# Patient Record
Sex: FEMALE | Race: BLACK OR AFRICAN AMERICAN | URBAN - METROPOLITAN AREA
[De-identification: names, ages, dates, MRNs, and addresses within clinical notes are randomized per-mention and may not be internally consistent; named-entity substitution may affect disease eponyms.]

---

## 2017-01-13 ENCOUNTER — OFFICE VISIT (OUTPATIENT)
Dept: FAMILY MEDICINE | Facility: CLINIC | Age: 77
End: 2017-01-13

## 2017-01-13 VITALS
HEIGHT: 61 IN | DIASTOLIC BLOOD PRESSURE: 80 MMHG | HEART RATE: 80 BPM | TEMPERATURE: 97.9 F | WEIGHT: 150 LBS | OXYGEN SATURATION: 96 % | BODY MASS INDEX: 28.32 KG/M2 | SYSTOLIC BLOOD PRESSURE: 180 MMHG | RESPIRATION RATE: 18 BRPM

## 2017-01-13 DIAGNOSIS — I10 ESSENTIAL HYPERTENSION WITH GOAL BLOOD PRESSURE LESS THAN 140/90: ICD-10-CM

## 2017-01-13 LAB
ANION GAP SERPL CALCULATED.3IONS-SCNC: 6 MMOL/L (ref 3–14)
BUN SERPL-MCNC: 10 MG/DL (ref 7–30)
CALCIUM SERPL-MCNC: 9.1 MG/DL (ref 8.5–10.1)
CHLORIDE SERPL-SCNC: 104 MMOL/L (ref 94–109)
CO2 SERPL-SCNC: 29 MMOL/L (ref 20–32)
CREAT SERPL-MCNC: 0.68 MG/DL (ref 0.52–1.04)
CREAT UR-MCNC: 31 MG/DL
GFR SERPL CREATININE-BSD FRML MDRD: 84 ML/MIN/1.7M2
GLUCOSE SERPL-MCNC: 87 MG/DL (ref 70–99)
MICROALBUMIN UR-MCNC: <5 MG/L
MICROALBUMIN/CREAT UR: NORMAL MG/G CR (ref 0–25)
POTASSIUM SERPL-SCNC: 4.2 MMOL/L (ref 3.4–5.3)
SODIUM SERPL-SCNC: 139 MMOL/L (ref 133–144)

## 2017-01-13 PROCEDURE — 99214 OFFICE O/P EST MOD 30 MIN: CPT | Performed by: PHYSICIAN ASSISTANT

## 2017-01-13 PROCEDURE — 36415 COLL VENOUS BLD VENIPUNCTURE: CPT | Performed by: PHYSICIAN ASSISTANT

## 2017-01-13 PROCEDURE — 80048 BASIC METABOLIC PNL TOTAL CA: CPT | Performed by: PHYSICIAN ASSISTANT

## 2017-01-13 PROCEDURE — 82043 UR ALBUMIN QUANTITATIVE: CPT | Performed by: PHYSICIAN ASSISTANT

## 2017-01-13 RX ORDER — AMLODIPINE BESYLATE 10 MG/1
TABLET ORAL
Qty: 30 TABLET | Refills: 0 | Status: SHIPPED | OUTPATIENT
Start: 2017-01-13 | End: 2017-02-14

## 2017-01-13 ASSESSMENT — PAIN SCALES - GENERAL: PAINLEVEL: EXTREME PAIN (9)

## 2017-01-13 NOTE — MR AVS SNAPSHOT
After Visit Summary   1/13/2017    Talita Sharp    MRN: [id number]           Patient Information     Date Of Birth          1940        Visit Information        Provider Department      1/13/2017 10:40 AM Karma Mehta PA-C [facility]        Today's Diagnoses     Hypertension goal BP (blood pressure) < 140/90    -  1     Essential hypertension with goal blood pressure less than 140/90           Care Instructions    How to contact your care team: [phone] Pharmacy [phone]   TOMÁS AU MD KATYA GEORGIEV, PA-C CHRIS JONES, PA-C NAM HO, MD JONATHAN BATES, MD ARVIN VOCAL, MD    Clinic hours M-Th 7am-7pm Fri 7am-5pm.   Urgent care M-F 11am-9pm  Sat/Sun 9am-5pm.   Pharmacy   Mon-Th:  8:00am-8pm   Fri:  8:00am-6:00pm  Sat/Sun  8:00am-5:00 pm             Follow-ups after your visit        Your next 10 appointments already scheduled     Feb 15, 2017 10:20 AM   [facility] Visit with Karma Mehta PA-C   [facility]    [address] [place] [id number] [phone]           Bring a current list of meds and any records pertaining to this visit.  For Physicals, please bring immunization records and any forms needing to be filled out.  Please arrive 10 minutes early to complete paperwork.              Who to contact     If you have questions or need follow up information about today's clinic visit or your schedule please contact [facility] directly at [phone].  Normal or non-critical lab and imaging results will be communicated to you by MyChart, letter or phone within 4 business days after the clinic has received the results. If you do not hear from us within 7 days, please contact the clinic through MyChart or phone. If you have a critical or abnormal lab result, we will notify you by phone as soon as possible.  Submit refill requests  "through Coin or call your pharmacy and they will forward the refill request to us. Please allow 3 business days for your refill to be completed.          Additional Information About Your Visit        [employer] Information     Coin lets you send messages to your doctor, view your test results, renew your prescriptions, schedule appointments and more. To sign up, go to [email or web address] . Click on \"Log in\" on the left side of the screen, which will take you to the Welcome page. Then click on \"Sign up Now\" on the right side of the page.     You will be asked to enter the access code listed below, as well as some personal information. Please follow the directions to create your username and password.     Your access code is: [email or web address]  Expires: 2017 11:03 AM     Your access code will  in 90 days. If you need help or a new code, please call your [place] clinic or [phone].        Care EveryWhere ID     This is your Care EveryWhere ID. This could be used by other organizations to access your [place] medical records  [id number]        Your Vitals Were     Pulse Temperature Respirations Height BMI (Body Mass Index) Pulse Oximetry    80 97.9  F (36.6  C) (Oral) 18 5' 0.5\" (1.537 m) 28.80 kg/m2 96%    Breastfeeding?                   No            Blood Pressure from Last 3 Encounters:   17 180/80   16 176/80   16 170/94    Weight from Last 3 Encounters:   17 150 lb (68.04 kg)   16 150 lb 12.8 oz (68.402 kg)   16 158 lb (71.668 kg)              We Performed the Following     Albumin Random Urine Quantitative     BASIC METABOLIC PANEL          Today's Medication Changes          These changes are accurate as of: 17 11:03 AM.  If you have any questions, ask your nurse or doctor.               These medicines have changed or have updated prescriptions.        Dose/Directions    amLODIPine 10 MG tablet   Commonly known as:  NORVASC   This may have changed:  " See the new instructions.   Used for:  Essential hypertension with goal blood pressure less than 140/90   Changed by:  Karma Mehta PA-C        TAKE 1 TABLET(10 MG) BY MOUTH DAILY   Quantity:  30 tablet   Refills:  0            Where to get your medicines      These medications were sent to [id number] - [place], [place] - [address] [facility]  [address] [facility] [address]    Hours:  24-hours Phone:  [phone]    - amLODIPine 10 MG tablet             Primary Care Provider Office Phone #    [facility] [phone]       No address on file        Thank you!     Thank you for choosing [facility]  for your care. Our goal is always to provide you with excellent care. Hearing back from our patients is one way we can continue to improve our services. Please take a few minutes to complete the written survey that you may receive in the mail after your visit with us. Thank you!             Your Updated Medication List - Protect others around you: Learn how to safely use, store and throw away your medicines at www.disposemymeds.org.          This list is accurate as of: 1/13/17 11:03 AM.  Always use your most recent med list.                   Brand Name Dispense Instructions for use    acetaminophen 325 MG tablet    TYLENOL    100 tablet    Take 2 tablets (650 mg) by mouth every 4 hours as needed for mild pain       amLODIPine 10 MG tablet    NORVASC    30 tablet    TAKE 1 TABLET(10 MG) BY MOUTH DAILY

## 2017-01-13 NOTE — PATIENT INSTRUCTIONS
How to contact your care team: [phone] Pharmacy [phone]   TOMÁS AU MD KATYA GEORGIEV, PA-C CHRIS JONES, PA-C NAM HO, MD JONATHAN BATES, MD ARVIN VOCAL, MD    Clinic hours M-Th 7am-7pm Fri 7am-5pm.   Urgent care M-F 11am-9pm  Sat/Sun 9am-5pm.   Pharmacy   Mon-Th:  8:00am-8pm   Fri:  8:00am-6:00pm  Sat/Sun  8:00am-5:00 pm

## 2017-01-13 NOTE — Clinical Note
[facility]  [address] [place] [address]  [phone]      January 16, 2017      Talita Sharp  [address] [address]              Dear Talita,      Your recent labs were normal.  Please follow instructions as discussed at our last office visit. Keep your scheduled follow up for recheck of your blood pressure.  Make sure you are taking your medication every day.  If you have any questions, please feel free to contact our office.      Sincerely,      Karma Mehta PA-C    Results for orders placed or performed in visit on 01/13/17   BASIC METABOLIC PANEL   Result Value Ref Range    Sodium 139 133 - 144 mmol/L    Potassium 4.2 3.4 - 5.3 mmol/L    Chloride 104 94 - 109 mmol/L    Carbon Dioxide 29 20 - 32 mmol/L    Anion Gap 6 3 - 14 mmol/L    Glucose 87 70 - 99 mg/dL    Urea Nitrogen 10 7 - 30 mg/dL    Creatinine 0.68 0.52 - 1.04 mg/dL    GFR Estimate 84 >60 mL/min/1.7m2    GFR Estimate If Black >90   GFR Calc   >60 mL/min/1.7m2    Calcium 9.1 8.5 - 10.1 mg/dL   Albumin Random Urine Quantitative   Result Value Ref Range    Creatinine Urine 31 mg/dL    Albumin Urine mg/L <5 mg/L    Albumin Urine mg/g Cr Unable to calculate due to low value 0 - 25 mg/g Cr

## 2017-01-13 NOTE — PROGRESS NOTES
"  SUBJECTIVE:                                                    Talita Sharp is a 76 year old female who presents to clinic today for hypertension follow up.  Not at goal at home or in clinic.  She is non compliant with medications, taking the pills only on days that she takes her BP and sees that it is elevated.  Denies chest pain, sob, orthopnea, exertional symptoms.      Hypertension Follow-up      Outpatient blood pressures are being checked at home.  Results are \"up and down\".    Low Salt Diet: no added salt       Amount of exercise or physical activity: None    Problems taking medications regularly: not currently taking medication    Medication side effects: none    Diet: regular (no restrictions)    Problem list and histories reviewed & adjusted, as indicated.  Additional history: as documented    Patient Active Problem List   Diagnosis     CARDIOVASCULAR SCREENING; LDL GOAL LESS THAN 160     Hypertension goal BP (blood pressure) < 140/90     Bilateral low back pain without sciatica     Past Surgical History   Procedure Laterality Date     No history of surgery         Social History   Substance Use Topics     Smoking status: Never Smoker      Smokeless tobacco: Never Used     Alcohol Use: No     History reviewed. No pertinent family history.      Current Outpatient Prescriptions   Medication Sig Dispense Refill     amLODIPine (NORVASC) 10 MG tablet TAKE 1 TABLET(10 MG) BY MOUTH DAILY 30 tablet 0     [DISCONTINUED] amLODIPine (NORVASC) 10 MG tablet TAKE 1 TABLET(10 MG) BY MOUTH DAILY 30 tablet 0     acetaminophen (TYLENOL) 325 MG tablet Take 2 tablets (650 mg) by mouth every 4 hours as needed for mild pain 100 tablet 0     No Known Allergies  BP Readings from Last 3 Encounters:   01/13/17 180/80   09/16/16 176/80   04/26/16 170/94    Wt Readings from Last 3 Encounters:   01/13/17 150 lb (68.04 kg)   09/16/16 150 lb 12.8 oz (68.402 kg)   04/26/16 158 lb (71.668 kg)          ROS:  Constitutional, neuro, ENT, " "endocrine, pulmonary, cardiac, gastrointestinal, genitourinary, musculoskeletal, integument and psychiatric systems are negative, except as otherwise noted.  CV: NEGATIVE for chest pain, palpitations or peripheral edema and POSITIVE for HX HTN    OBJECTIVE:                                                    /80 mmHg  Pulse 80  Temp(Src) 97.9  F (36.6  C) (Oral)  Resp 18  Ht 5' 0.5\" (1.537 m)  Wt 150 lb (68.04 kg)  BMI 28.80 kg/m2  SpO2 96%  Breastfeeding? No  Body mass index is 28.8 kg/(m^2).     GENERAL APPEARANCE: healthy, alert and no distress  NECK: no adenopathy, no asymmetry, masses, or scars, thyroid normal to palpation and no bruits  RESP: lungs clear to auscultation - no rales, rhonchi or wheezes  CV: regular rates and rhythm, normal S1 S2, no S3 or S4 and no murmur, click or rub  MS: extremities normal- no gross deformities noted  SKIN: no suspicious lesions or rashes  NEURO: Normal strength and tone, mentation intact and speech normal  PSYCH: mentation appears normal and affect normal/bright       ASSESSMENT/PLAN:                                                    (I10) Essential hypertension with goal blood pressure less than 140/90  Comment: not at goal, non-compliant  Plan: BASIC METABOLIC PANEL, Albumin Random Urine         Quantitative, amLODIPine (NORVASC) 10 MG tablet        I have again reviewed the importance of taking her BP pills everyday and that this medication is not doing anything for her being taken at the current frequency (30 pills lasted 90+ days).  Her daughter accompanies her today and states understanding.  It is unclear if patient truly understands the importance but she does nod when explained.  Her daughter will try to help her remember.  May be beneficial to take her BP everyday since this seems to be a trigger for her taking her medication.  However, it becomes controlled she may stop the medication, seeing normal readings.  I have reviewed her BP goal of less than " 150/90 for age and problem list/medical history, as well as the risks for untreated hypertension.       Follow up with Provider - 4 weeks - scheduled     Karma Mehta PA-C  [facility]

## 2017-01-13 NOTE — NURSING NOTE
"Chief Complaint   Patient presents with     Hypertension       Initial /80 mmHg  Pulse 80  Temp(Src) 97.9  F (36.6  C) (Oral)  Resp 18  Ht 5' 0.5\" (1.537 m)  Wt 150 lb (68.04 kg)  BMI 28.80 kg/m2  SpO2 96%  Breastfeeding? No Estimated body mass index is 28.8 kg/(m^2) as calculated from the following:    Height as of this encounter: 5' 0.5\" (1.537 m).    Weight as of this encounter: 150 lb (68.04 kg).  BP completed using cuff size: large    Angeles Luna MA         "

## 2017-01-16 NOTE — PROGRESS NOTES
Quick Note:    Please mail results to patient with the following message:    Dear Talita,    Your recent labs were normal. Please follow instructions as discussed at our last office visit. Keep your scheduled follow up for recheck of your blood pressure. Make sure you are taking your medication every day. If you have any questions, please feel free to contact our office.    Thanks,    Karma Mehta PA-C    Your care team's suggested websites for health information:  Www.[employer].org : Up to date and easily searchable information on multiple topics.  Www.medlineplus.gov : medication info, interactive tutorials, watch real surgeries online    Www.familydoctor.org : good info from the Academy of Family Physicians  Www.cdc.gov : public health info, travel advisories, epidemics (H1N1)  Www.aap.org : children's health info, normal development, vaccinations  Www.health.[facility].[place].us : [place] dept of   kelli, public health issues in [place], N1N1    How to contact your care team: [facility] [phone] Pharmacy [phone]  Dr. Sudheer Broussard, Dr. Richie Avery, Dr. Debbi López, Dr. Mya Patel, Karma Mehta PA-C.  Clinic hours  M-Th 7am-7pm  Fri 7am-6pm.   Urgent care M-F 12am-8pm,  Sat/sun 9am-5pm.  Pharmacy M-F 8:30am-8pm Sat/sun 9am-5pm.     ______

## 2017-02-14 ENCOUNTER — OFFICE VISIT (OUTPATIENT)
Dept: FAMILY MEDICINE | Facility: CLINIC | Age: 77
End: 2017-02-14

## 2017-02-14 VITALS
OXYGEN SATURATION: 99 % | BODY MASS INDEX: 24.16 KG/M2 | TEMPERATURE: 98.5 F | HEIGHT: 65 IN | WEIGHT: 145 LBS | SYSTOLIC BLOOD PRESSURE: 145 MMHG | DIASTOLIC BLOOD PRESSURE: 85 MMHG

## 2017-02-14 DIAGNOSIS — I10 HTN, GOAL BELOW 150/90: Primary | ICD-10-CM

## 2017-02-14 PROCEDURE — 99213 OFFICE O/P EST LOW 20 MIN: CPT | Performed by: PREVENTIVE MEDICINE

## 2017-02-14 RX ORDER — AMLODIPINE BESYLATE 10 MG/1
TABLET ORAL
Qty: 90 TABLET | Refills: 1 | Status: SHIPPED | OUTPATIENT
Start: 2017-02-14 | End: 2017-09-04

## 2017-02-14 ASSESSMENT — PAIN SCALES - GENERAL: PAINLEVEL: NO PAIN (0)

## 2017-02-14 NOTE — PATIENT INSTRUCTIONS
At [facility], we strive to deliver an exceptional experience to you, every time we see you.    If you receive a survey in the mail, please send us back your thoughts. We really do value your feedback.    Thank you for visiting [facility]    Normal or non-critical lab and imaging results will be communicated to you by MyChart, letter or phone within 7 days.  If you do not hear from us within 10 days, please call the clinic. If you have a critical or abnormal lab result, we will notify you by phone as soon as possible.     If you have any questions regarding your visit please contact:     [facility]  Clinic Hours Telephone Number   Dr. Shar Fine   7am-7pm  Monday through Thursday  7am-5pm Friday [phone]  Christina BAILON RN   Pharmacy 8:30am-9pm Monday-Friday    9am-5pm Saturday-Sunday [phone]   Urgent Care 11am-9pm Monday-Friday        9am-5pm Saturday-Sunday [phone]     After hours, weekend or if you need to make an appointment with your primary provider please call [phone].   After Hours nurse advise: call [place] [facility]: [phone]    Use [employer] (secure email communication and access to your chart) to send your primary care provider a message or make an appointment. Ask someone on your Team how to sign up for [employer]. To log on to [employer] or for more information in [employer] please visit the website at [email or web address].   As of October 8, 2013, all password changes, disabled accounts, or ID changes in [employer]/MyHealth will be done by our Access Services Department.   If you need help with your account or password, call: [phone]. Clinic staff no longer has the ability to change passwords.

## 2017-02-14 NOTE — NURSING NOTE
"Chief Complaint   Patient presents with     Hypertension       Initial /73 (BP Location: Right arm, Cuff Size: Adult Regular)  Temp 98.5  F (36.9  C) (Oral)  Ht 5' 5\" (1.651 m)  Wt 145 lb (65.8 kg)  SpO2 99%  Breastfeeding? No  BMI 24.13 kg/m2 Estimated body mass index is 24.13 kg/(m^2) as calculated from the following:    Height as of this encounter: 5' 5\" (1.651 m).    Weight as of this encounter: 145 lb (65.8 kg).  Medication Reconciliation: complete   Alma VIDALES        "

## 2017-02-14 NOTE — PROGRESS NOTES
SUBJECTIVE:                                                    Talita Sharp is a 76 year old female who presents to clinic today for the following health issues:    I have reviewed and agree with the documentation by the MA. I updated the history as indicated.  Mya Patel MD MPH      Hypertension Follow-up      Outpatient blood pressures are being checked at home.  Results are unknown.    Low Salt Diet: low salt       Amount of exercise or physical activity: None    Problems taking medications regularly: No    Medication side effects: none  Diet: low salt  Ran out of medication yesterday     Problem list and histories reviewed & adjusted, as indicated.  Additional history: as documented    Patient Active Problem List   Diagnosis     CARDIOVASCULAR SCREENING; LDL GOAL LESS THAN 160     Hypertension goal BP (blood pressure) < 140/90     Bilateral low back pain without sciatica     HTN, goal below 150/90     Past Surgical History   Procedure Laterality Date     No history of surgery         Social History   Substance Use Topics     Smoking status: Never Smoker     Smokeless tobacco: Never Used     Alcohol use No     History reviewed. No pertinent family history.      Current Outpatient Prescriptions   Medication Sig Dispense Refill     amLODIPine (NORVASC) 10 MG tablet TAKE 1 TABLET(10 MG) BY MOUTH DAILY 90 tablet 1     acetaminophen (TYLENOL) 325 MG tablet Take 2 tablets (650 mg) by mouth every 4 hours as needed for mild pain 100 tablet 0     [DISCONTINUED] amLODIPine (NORVASC) 10 MG tablet TAKE 1 TABLET(10 MG) BY MOUTH DAILY 30 tablet 0     No Known Allergies  BP Readings from Last 3 Encounters:   02/14/17 145/85   01/13/17 180/80   09/16/16 176/80    Wt Readings from Last 3 Encounters:   02/14/17 145 lb (65.8 kg)   01/13/17 150 lb (68 kg)   09/16/16 150 lb 12.8 oz (68.4 kg)           ROS:  Constitutional, HEENT, cardiovascular, pulmonary, gi and gu systems are negative, except as otherwise noted.    OBJECTIVE:   "                                                  /85  Temp 98.5  F (36.9  C) (Oral)  Ht 5' 5\" (1.651 m)  Wt 145 lb (65.8 kg)  SpO2 99%  Breastfeeding? No  BMI 24.13 kg/m2  Body mass index is 24.13 kg/(m^2).  GENERAL APPEARANCE: healthy, alert and no distress  EYES: Eyes grossly normal to inspection and conjunctivae and sclerae normal  RESP: lungs clear to auscultation - no rales, rhonchi or wheezes  CV: regular rates and rhythm, normal S1 S2, no S3 or S4 and no murmur, click or rub  ABDOMEN: soft, non-tender  MS: extremities normal- no gross deformities noted and peripheral pulses normal  SKIN: no suspicious lesions or rashes  NEURO: Normal strength and tone, mentation intact and speech normal  PSYCH: mentation appears normal    Diagnostic test results:  Diagnostic Test Results:  Results for orders placed or performed in visit on 01/13/17   BASIC METABOLIC PANEL   Result Value Ref Range    Sodium 139 133 - 144 mmol/L    Potassium 4.2 3.4 - 5.3 mmol/L    Chloride 104 94 - 109 mmol/L    Carbon Dioxide 29 20 - 32 mmol/L    Anion Gap 6 3 - 14 mmol/L    Glucose 87 70 - 99 mg/dL    Urea Nitrogen 10 7 - 30 mg/dL    Creatinine 0.68 0.52 - 1.04 mg/dL    GFR Estimate 84 >60 mL/min/1.7m2    GFR Estimate If Black >90   GFR Calc   >60 mL/min/1.7m2    Calcium 9.1 8.5 - 10.1 mg/dL   Albumin Random Urine Quantitative   Result Value Ref Range    Creatinine Urine 31 mg/dL    Albumin Urine mg/L <5 mg/L    Albumin Urine mg/g Cr Unable to calculate due to low value 0 - 25 mg/g Cr        ASSESSMENT/PLAN:                                                    1. HTN, goal below 150/90  -Refilled Amlodipine 10 mg daily  -Low salt diet  -Monitor BP at home     I ended our visit today by discussing the patient's diagnoses and recommended treatment. Please refer to today's diagnoses and orders for further details. I briefly discussed the pathophysiology of these conditions and outlined their expected course. I discussed the " warning symptoms and signs that indicate an atypical course that would need urgent or emergent care. I also discussed self care strategies for symptom relief.  Patient voiced complete understanding of plan of care and was in full agreement to proceed. After visit summary discussed and handed to patient.    Common side effects of medications prescribed at this visit were discussed with the patient. Severe side effects, including current applicable black box warnings, were discussed.       Follow up with Provider - 6 months, sooner if any changes or concerns      Mya Patel MD MPH    [facility]

## 2017-02-14 NOTE — MR AVS SNAPSHOT
After Visit Summary   2/14/2017    Talita Sharp    MRN: [id number]           Patient Information     Date Of Birth          1940        Visit Information        Provider Department      2/14/2017 1:00 PM Mya Patel MD [facility]        Today's Diagnoses     Asymptomatic postmenopausal status        Need for prophylactic vaccination with tetanus-diphtheria (TD)        Essential hypertension with goal blood pressure less than 140/90          Care Instructions    At [facility], we strive to deliver an exceptional experience to you, every time we see you.    If you receive a survey in the mail, please send us back your thoughts. We really do value your feedback.    Thank you for visiting [facility]    Normal or non-critical lab and imaging results will be communicated to you by MyChart, letter or phone within 7 days.  If you do not hear from us within 10 days, please call the clinic. If you have a critical or abnormal lab result, we will notify you by phone as soon as possible.     If you have any questions regarding your visit please contact:     Team Milana/Spirit  Clinic Hours Telephone Number   Dr. Shar Fine   7am-7pm  Monday through Thursday  7am-5pm Friday [phone]  Christina BAILON RN   Pharmacy 8:30am-9pm Monday-Friday    9am-5pm Saturday-Sunday [phone]   Urgent Care 11am-9pm Monday-Friday        9am-5pm Saturday-Sunday [phone]     After hours, weekend or if you need to make an appointment with your primary provider please call [phone].   After Hours nurse advise: call [facility]: [phone]    Use Swiftypehart (secure email communication and access to your chart) to send your primary care provider a message or make an appointment. Ask someone on your Team how to sign up for Fashion To Figure. To log on  "to [employer] or for more information in [place] please visit the website at [email or web address].   As of 2013, all password changes, disabled accounts, or ID changes in News360/MyHealth will be done by our Access Services Department.   If you need help with your account or password, call: [phone]. Clinic staff no longer has the ability to change passwords.           Follow-ups after your visit        Who to contact     If you have questions or need follow up information about today's clinic visit or your schedule please contact [facility] directly at [phone].  Normal or non-critical lab and imaging results will be communicated to you by Achieved.cohart, letter or phone within 4 business days after the clinic has received the results. If you do not hear from us within 7 days, please contact the clinic through Achieved.cohart or phone. If you have a critical or abnormal lab result, we will notify you by phone as soon as possible.  Submit refill requests through News360 or call your pharmacy and they will forward the refill request to us. Please allow 3 business days for your refill to be completed.          Additional Information About Your Visit        Achieved.coharZooplus Information     News360 lets you send messages to your doctor, view your test results, renew your prescriptions, schedule appointments and more. To sign up, go to [email or web address] . Click on \"Log in\" on the left side of the screen, which will take you to the Welcome page. Then click on \"Sign up Now\" on the right side of the page.     You will be asked to enter the access code listed below, as well as some personal information. Please follow the directions to create your username and password.     Your access code is: [email or web address]  Expires: 2017 11:03 AM     Your access code will  in 90 days. If you need help or a new code, please call your [facility] or [phone].        Care EveryWhere ID     This is your " "Care EveryWhere ID. This could be used by other organizations to access your [place] medical records  [id number]        Your Vitals Were     Temperature Height Pulse Oximetry Breastfeeding? BMI (Body Mass Index)       98.5  F (36.9  C) (Oral) 5' 5\" (1.651 m) 99% No 24.13 kg/m2        Blood Pressure from Last 3 Encounters:   02/14/17 150/64   01/13/17 180/80   09/16/16 176/80    Weight from Last 3 Encounters:   02/14/17 145 lb (65.8 kg)   01/13/17 150 lb (68 kg)   09/16/16 150 lb 12.8 oz (68.4 kg)              Today, you had the following     No orders found for display         Where to get your medicines      These medications were sent to [facility] [address] [facility] [address] [facility]  [address] [facility] [address] [address]    Hours:  24-hours Phone:  [phone]     amLODIPine 10 MG tablet          Primary Care Provider Office Phone #    [facility] [phone]       No address on file        Thank you!     Thank you for choosing [facility]  for your care. Our goal is always to provide you with excellent care. Hearing back from our patients is one way we can continue to improve our services. Please take a few minutes to complete the written survey that you may receive in the mail after your visit with us. Thank you!             Your Updated Medication List - Protect others around you: Learn how to safely use, store and throw away your medicines at www.disposemymeds.org.          This list is accurate as of: 2/14/17  1:40 PM.  Always use your most recent med list.                   Brand Name Dispense Instructions for use    acetaminophen 325 MG tablet    TYLENOL    100 tablet    Take 2 tablets (650 mg) by mouth every 4 hours as needed for mild pain       amLODIPine 10 MG tablet    NORVASC    90 tablet    TAKE 1 TABLET(10 MG) BY MOUTH DAILY         "

## 2017-09-04 DIAGNOSIS — I10 ESSENTIAL HYPERTENSION: Primary | ICD-10-CM

## 2017-09-05 RX ORDER — AMLODIPINE BESYLATE 10 MG/1
TABLET ORAL
Qty: 90 TABLET | Refills: 0 | Status: SHIPPED | OUTPATIENT
Start: 2017-09-05 | End: 2017-12-10

## 2017-12-10 DIAGNOSIS — I10 ESSENTIAL HYPERTENSION: ICD-10-CM

## 2017-12-10 NOTE — TELEPHONE ENCOUNTER
Requested Prescriptions   Pending Prescriptions Disp Refills     amLODIPine (NORVASC) 10 MG tablet [Pharmacy Med Name: AMLODIPINE BESYLATE 10MG TABLETS]    Last Written Prescription Date:  9/5/17  Last Fill Quantity: 90,  # refills: 0   Last Office Visit with [facility], [facility] or [facility] prescribing provider:  2/14/17   Future Office Visit:      90 tablet 0     Sig: TAKE 1 TABLET(10 MG) BY MOUTH DAILY    Calcium Channel Blockers Protocol  Failed    12/10/2017  4:09 PM       Failed - Blood pressure under 140/90    BP Readings from Last 3 Encounters:   02/14/17 145/85   01/13/17 180/80   09/16/16 176/80                Passed - Recent or future visit with authorizing provider    Patient had office visit in the last year or has a visit in the next 30 days with authorizing provider.  See chart review.              Passed - Patient is age 18 or older       Passed - No active pregnancy on record       Passed - Normal serum creatinine on file in past 12 months    Recent Labs   Lab Test  01/13/17   1114   CR  0.68            Passed - No positive pregnancy test in past 12 months              Trenton Faarax  [facility]

## 2017-12-13 RX ORDER — AMLODIPINE BESYLATE 10 MG/1
TABLET ORAL
Qty: 90 TABLET | Refills: 0 | Status: SHIPPED | OUTPATIENT
Start: 2017-12-13

## 2017-12-13 NOTE — TELEPHONE ENCOUNTER
Routing refill request to provider for review/approval because:  Patient needs to be seen because:  6 mo F/U  BP out of range

## 2018-01-30 ENCOUNTER — OFFICE VISIT (OUTPATIENT)
Dept: URGENT CARE | Facility: URGENT CARE | Age: 78
End: 2018-01-30

## 2018-01-30 VITALS
WEIGHT: 147 LBS | TEMPERATURE: 99.2 F | OXYGEN SATURATION: 98 % | BODY MASS INDEX: 24.46 KG/M2 | SYSTOLIC BLOOD PRESSURE: 190 MMHG | HEART RATE: 90 BPM | DIASTOLIC BLOOD PRESSURE: 74 MMHG

## 2018-01-30 DIAGNOSIS — I10 BENIGN ESSENTIAL HYPERTENSION: Primary | ICD-10-CM

## 2018-01-30 DIAGNOSIS — H53.9 VISION CHANGES: ICD-10-CM

## 2018-01-30 DIAGNOSIS — R51.9 ACUTE INTRACTABLE HEADACHE, UNSPECIFIED HEADACHE TYPE: ICD-10-CM

## 2018-01-30 PROCEDURE — 99215 OFFICE O/P EST HI 40 MIN: CPT | Performed by: PHYSICIAN ASSISTANT

## 2018-01-30 ASSESSMENT — ENCOUNTER SYMPTOMS
GASTROINTESTINAL NEGATIVE: 1
PSYCHIATRIC NEGATIVE: 1
CARDIOVASCULAR NEGATIVE: 1
MUSCULOSKELETAL NEGATIVE: 1
EYES NEGATIVE: 1

## 2018-01-30 NOTE — PROGRESS NOTES
SUBJECTIVE:   Talita Sharp is a 77 year old female presenting with a chief complaint of   Chief Complaint   Patient presents with     Hypertension     Pt c/o high BP was around 200/70 yesterday.    .    Onset of symptoms was 1 day(s) ago.  Course of illness is worsening.    Severity moderate  Current and Associated symptoms: dizziness, weak, headaches  Treatment measures tried include Fluids and Rest.  Predisposing factors include: hx of hypertension    She feels a little weak  She takes Norvasc for HTN  She started feeling like this yesterday  Frontal headache - she feels is related to the blood pressure  She also has a runny nose  She has chills, no fever  She also has a cough  Blurry vision since yesterday   No chest pain  No change in speech per family     When she normally checks her blood pressure at home, it is usually around 100/75       Review of Systems   Constitutional:        As in HPI   HENT:        As in HPI   Eyes: Negative.    Respiratory:        As in HPI   Cardiovascular: Negative.    Gastrointestinal: Negative.    Genitourinary: Negative.    Musculoskeletal: Negative.    Skin: Negative.    Neurological:        As in HPI   Psychiatric/Behavioral: Negative.          Past Medical History:   Diagnosis Date     Hypertension      Current Outpatient Prescriptions   Medication Sig Dispense Refill     amLODIPine (NORVASC) 10 MG tablet TAKE 1 TABLET(10 MG) BY MOUTH DAILY 90 tablet 0     acetaminophen (TYLENOL) 325 MG tablet Take 2 tablets (650 mg) by mouth every 4 hours as needed for mild pain 100 tablet 0     Social History   Substance Use Topics     Smoking status: Never Smoker     Smokeless tobacco: Never Used     Alcohol use No     No significant family history     OBJECTIVE  /74  Pulse 90  Temp 99.2  F (37.3  C) (Oral)  Wt 147 lb (66.7 kg)  SpO2 98%  Breastfeeding? No  BMI 24.46 kg/m2    Physical Exam   Constitutional: She is oriented to person, place, and time and well-developed,  well-nourished, and in no distress.   HENT:   Head: Normocephalic and atraumatic.   Right Ear: Tympanic membrane and ear canal normal.   Left Ear: Tympanic membrane and ear canal normal.   Nose: Nose normal.   Mouth/Throat: Uvula is midline, oropharynx is clear and moist and mucous membranes are normal.   Eyes: Conjunctivae and EOM are normal. Pupils are equal, round, and reactive to light. Right eye exhibits no discharge. Left eye exhibits no discharge.   Neck: Normal range of motion. Neck supple.   Cardiovascular: Normal rate, regular rhythm and normal heart sounds.    Pulmonary/Chest: Effort normal and breath sounds normal.   Abdominal: Soft.   Musculoskeletal: Normal range of motion.   Lymphadenopathy:     She has no cervical adenopathy.   Neurological: She is alert and oriented to person, place, and time. She has normal sensation and normal strength. She shows no pronator drift. Gait normal.   Reflex Scores:       Tricep reflexes are 3+ on the right side and 3+ on the left side.       Bicep reflexes are 3+ on the right side and 3+ on the left side.       Patellar reflexes are 3+ on the right side and 3+ on the left side.  Decreased hearing on the right   Cranial Nerves:  EYES: Normal, No nystagmus, EOM, PERRL  Normal face sensation  Normal corneal reflex  Normal masseter / temporalis  Normal face strength and symmetry  Normal hearing  Normal swallowing  Uvula midline  Full trapezius / sternocleidomastoid strength  Normal tongue protrusion  Neurologic Exam:  Gait: Poor balance  Strength: 5/5 deltoid, biceps, triceps, , hip flexion, knee flexion, dorsiflexion, plantarflexion  Visual fields intact: right, left and bilateral  Pronator drift: negative  Rapid hand movements - not intact  Finger to nose - not intact  DTR equal bilaterally: biceps, triceps, brachioradialis, patellar, achilles, Babinski  Negative clonus  Sensation intact toes and shoulders     Skin: Skin is warm and dry.   Psychiatric: Mood normal.  She has a flat affect.   Patient has a difficult time responding to questions - language barrier vs. Actual delay       Labs:  No results found for this or any previous visit (from the past 24 hour(s)).        ASSESSMENT:      ICD-10-CM    1. Benign essential hypertension I10    2. Acute intractable headache, unspecified headache type R51    3. Vision changes H53.9         Medical Decision Making:    Differential Diagnosis:  Stroke, HTN    Serious Comorbid Conditions:  Adult:  None    PLAN:    With her new onset of symptoms including headache, vision changes, and elevated BP - I would like her to do to the ED for stroke rule out  She will go to [facility] with her family    Followup:    Transfer to ED via private car  Patient sent to the ED for further imaging - evaluation for stroke or other intracranial lesion.      There are no Patient Instructions on file for this visit.    Myrtle Barr PA-C

## 2018-01-30 NOTE — NURSING NOTE
"Chief Complaint   Patient presents with     Hypertension     Pt c/o high BP was around 200/70 yesterday.        Initial /80 (BP Location: Left arm, Patient Position: Chair, Cuff Size: Adult Regular)  Pulse 90  Temp 99.2  F (37.3  C) (Oral)  Wt 147 lb (66.7 kg)  SpO2 98%  Breastfeeding? No  BMI 24.46 kg/m2 Estimated body mass index is 24.46 kg/(m^2) as calculated from the following:    Height as of 2/14/17: 5' 5\" (1.651 m).    Weight as of this encounter: 147 lb (66.7 kg).  Medication Reconciliation: complete     Pascale Brown CMA (AAMA)      "

## 2018-01-30 NOTE — MR AVS SNAPSHOT
"              After Visit Summary   2018    Talita Sharp    MRN: [id number]           Patient Information     Date Of Birth          1940        Visit Information        Provider Department      2018 12:00 PM Myrtle Barr PA-C [facility]        Today's Diagnoses     Benign essential hypertension    -  1    Acute intractable headache, unspecified headache type        Vision changes           Follow-ups after your visit        Who to contact     If you have questions or need follow up information about today's clinic visit or your schedule please contact [facility] directly at [phone].  Normal or non-critical lab and imaging results will be communicated to you by SoundTaghart, letter or phone within 4 business days after the clinic has received the results. If you do not hear from us within 7 days, please contact the clinic through SoundTaghart or phone. If you have a critical or abnormal lab result, we will notify you by phone as soon as possible.  Submit refill requests through FRUCT or call your pharmacy and they will forward the refill request to us. Please allow 3 business days for your refill to be completed.          Additional Information About Your Visit        MyChart Information     FRUCT lets you send messages to your doctor, view your test results, renew your prescriptions, schedule appointments and more. To sign up, go to [email or web address] . Click on \"Log in\" on the left side of the screen, which will take you to the Welcome page. Then click on \"Sign up Now\" on the right side of the page.     You will be asked to enter the access code listed below, as well as some personal information. Please follow the directions to create your username and password.     Your access code is: [email or web address]  Expires: 2018 12:18 PM     Your access code will  in 90 days. If you need help or a new code, please call your [facility] or " [phone].        Care EveryWhere ID     This is your Care EveryWhere ID. This could be used by other organizations to access your [place] medical records  [id number]        Your Vitals Were     Pulse Temperature Pulse Oximetry Breastfeeding? BMI (Body Mass Index)       90 99.2  F (37.3  C) (Oral) 98% No 24.46 kg/m2        Blood Pressure from Last 3 Encounters:   01/30/18 190/74   02/14/17 145/85   01/13/17 180/80    Weight from Last 3 Encounters:   01/30/18 147 lb (66.7 kg)   02/14/17 145 lb (65.8 kg)   01/13/17 150 lb (68 kg)              Today, you had the following     No orders found for display       Primary Care Provider Office Phone # Fax #    [facility] [phone]       [address]  [facility] [address]        Equal Access to Services     ANN HUNTER : Hadii doug ku hadasho Soomaali, waaxda luqadaha, qaybta kaalmada adeegyada, rubi kramer . So [facility] [phone].    ATENCIÓN: Si habla español, tiene a alvarez disposición servicios gratuitos de asistencia lingüística. Llame al [phone].    We comply with applicable federal civil rights laws and [place] laws. We do not discriminate on the basis of race, color, national origin, age, disability, sex, sexual orientation, or gender identity.            Thank you!     Thank you for choosing [facility]  for your care. Our goal is always to provide you with excellent care. Hearing back from our patients is one way we can continue to improve our services. Please take a few minutes to complete the written survey that you may receive in the mail after your visit with us. Thank you!             Your Updated Medication List - Protect others around you: Learn how to safely use, store and throw away your medicines at www.disposemymeds.org.          This list is accurate as of 1/30/18  1:01 PM.  Always use your most recent med list.                   Brand Name Dispense  Instructions for use Diagnosis    acetaminophen 325 MG tablet    TYLENOL    100 tablet    Take 2 tablets (650 mg) by mouth every 4 hours as needed for mild pain    Chronic bilateral low back pain without sciatica       amLODIPine 10 MG tablet    NORVASC    90 tablet    TAKE 1 TABLET(10 MG) BY MOUTH DAILY    Essential hypertension

## 2018-08-16 DIAGNOSIS — I10 ESSENTIAL HYPERTENSION: ICD-10-CM

## 2018-08-16 NOTE — LETTER
Talita Sharp  [address] [address]          08/22/18      Dear Taliat Sharp        At [facility] we care about your health and are committed to providing quality patient care. Regular appointments are a vital part of the care and management of your health and can help prevent many of the complications that can occur.      It has come to our attention that your refill request has been denied and that you are due for an office visit. Please call [facility] at [phone] soon to schedule your appointment.    If you have transferred care to another clinic please call to inform us so that we do not continue to send you reminder letters.      Sincerely,      [facility] Care Team

## 2018-08-20 RX ORDER — AMLODIPINE BESYLATE 10 MG/1
TABLET ORAL
Qty: 90 TABLET | Refills: 0 | OUTPATIENT
Start: 2018-08-20

## 2018-08-20 NOTE — TELEPHONE ENCOUNTER
Gap in medication.  When last seen (01/2018), patient was not controlled.  Please call and have her schedule an appointment.  Not refilled.  NELSON Mcdermott CNP

## 2018-08-20 NOTE — TELEPHONE ENCOUNTER
Routing refill request to provider for review/approval because:  Patient needs to be seen because it has been more than 1 year since last office visit.    Sergey Peck RN, BSN

## 2018-08-21 NOTE — TELEPHONE ENCOUNTER
This writer attempted to contact Patient on 08/21/18      Reason for call Patient to schedule an appointment and left a voicemail message.      If patient calls back:   Schedule Office Visit appointment within 1 week with primary care, document that pt called and close encounter   Postponing message.      Cyndie Starks MA

## 2021-05-26 NOTE — TELEPHONE ENCOUNTER
"Requested Prescriptions   Pending Prescriptions Disp Refills     amLODIPine (NORVASC) 10 MG tablet [Pharmacy Med Name: AMLODIPINE BESYLATE 10MG TABLETS] 90 tablet 0    Last Written Prescription Date:  12/13/17  Last Fill Quantity: 90,  # refills: 0   Last Office Visit with [facility], [facility] or [facility] prescribing provider:  1/30/2018     Future Office Visit:      Sig: TAKE 1 TABLET(10 MG) BY MOUTH DAILY    Calcium Channel Blockers Protocol  Failed    8/16/2018  4:59 PM       Failed - Blood pressure under 140/90 in past 12 months    BP Readings from Last 3 Encounters:   01/30/18 190/74   02/14/17 145/85   01/13/17 180/80                Failed - Recent (12 mo) or future (30 days) visit within the authorizing provider's specialty    Patient had office visit in the last 12 months or has a visit in the next 30 days with authorizing provider or within the authorizing provider's specialty.  See \"Patient Info\" tab in inbasket, or \"Choose Columns\" in Meds & Orders section of the refill encounter.           Failed - Normal serum creatinine on file in past 12 months    Recent Labs   Lab Test  01/13/17   1114   CR  0.68            Passed - Patient is age 18 or older       Passed - No active pregnancy on record       Passed - No positive pregnancy test in past 12 months          " Time spent: [address] [facility] Starr Jeff MD  Internal Medicine Residency Faculty  5/26/2021      The patient is being evaluated by a telephone encounter to address concerns as mentioned above. A caregiver was present when appropriate. Due to this being a TeleHealth encounter (During NRKL-65 public health emergency), evaluation of the following organ systems was limited: Vitals/Constitutional/EENT/Resp/CV/GI//MS/Neuro/Skin/Heme-Lymph-Imm. Pursuant to the emergency declaration under the [address] [place] [address], [address] [place] [address] wa[facility] of [facility] Hospital authority and the [employer] Act, this Virtual Visit was conducted with patient's (and/or legal guardian's) consent, to reduce the patient's risk of exposure to COVID-19 and provide necessary medical care. The patient (and/or legal guardian) has also been advised to contact this office for worsening conditions or problems, and seek emergency medical treatment and/or call 911 if deemed necessary. Services were provided through a video synchronous discussion virtually to substitute for in-person clinic visit. Patient and provider were located at their individual homes.